# Patient Record
Sex: FEMALE | Race: WHITE | NOT HISPANIC OR LATINO | Employment: FULL TIME | URBAN - METROPOLITAN AREA
[De-identification: names, ages, dates, MRNs, and addresses within clinical notes are randomized per-mention and may not be internally consistent; named-entity substitution may affect disease eponyms.]

---

## 2023-07-18 ENCOUNTER — OFFICE VISIT (OUTPATIENT)
Dept: OBGYN CLINIC | Facility: CLINIC | Age: 63
End: 2023-07-18
Payer: COMMERCIAL

## 2023-07-18 VITALS
WEIGHT: 205 LBS | SYSTOLIC BLOOD PRESSURE: 124 MMHG | DIASTOLIC BLOOD PRESSURE: 80 MMHG | HEIGHT: 59 IN | BODY MASS INDEX: 41.33 KG/M2

## 2023-07-18 DIAGNOSIS — Z01.419 ENCOUNTER FOR GYNECOLOGICAL EXAMINATION WITHOUT ABNORMAL FINDING: Primary | ICD-10-CM

## 2023-07-18 DIAGNOSIS — Z12.31 ENCOUNTER FOR SCREENING MAMMOGRAM FOR BREAST CANCER: ICD-10-CM

## 2023-07-18 DIAGNOSIS — N76.3 SUBACUTE VULVITIS: ICD-10-CM

## 2023-07-18 DIAGNOSIS — Z12.4 SCREENING FOR CERVICAL CANCER: ICD-10-CM

## 2023-07-18 PROCEDURE — G0101 CA SCREEN;PELVIC/BREAST EXAM: HCPCS | Performed by: OBSTETRICS & GYNECOLOGY

## 2023-07-18 RX ORDER — LORATADINE 10 MG/1
10 TABLET ORAL DAILY
COMMUNITY
Start: 2023-06-06

## 2023-07-18 RX ORDER — DICLOFENAC POTASSIUM 50 MG/1
TABLET, FILM COATED ORAL
COMMUNITY
Start: 2023-06-21

## 2023-07-18 RX ORDER — CHLORTHALIDONE 25 MG/1
TABLET ORAL
COMMUNITY
Start: 2023-05-22

## 2023-07-18 NOTE — PROGRESS NOTES
[address] [place] [address], [address], [place], [address] [facility],[address]    ASSESSMENT/PLAN: Rui Jacobson is a 63 y.o.  who presents for annual gynecologic exam.    Encounter for routine gynecologic examination  - Routine well woman exam completed today. - Cervical Cancer Screening: Current ASCCP Guidelines reviewed. Last Pap: Not on file . Next Pap Due: today  - COVID vaccine  + JJ    - Contraceptive counseling discussed. Current contraception: none or condoms:   - Breast Cancer Screening: Last Mammogram Not on file,    - Colorectal cancer screening  Pt due back in  6 years    - The following were reviewed in today's visit: breast self exam, mammography screening ordered, family planning choices, menopause, osteoporosis, adequate intake of calcium and vitamin D, exercise and healthy diet    Additional problems addressed during this visit:  1. Encounter for gynecological examination without abnormal finding    2. Encounter for screening mammogram for breast cancer  -     Mammo screening bilateral w 3d & cad; Future    3. Screening for cervical cancer  -     IGP, Aptima HPV, Rfx 16/18,45    4. BMI 40.0-44.9, adult [facility])  Comments:  pt lost  90 +         CC:  Annual Gynecologic Examination    HPI: Rui Jacobson is a 63 y.o. No obstetric history on file. who presents for annual gynecologic examination. 62 yo  Here for wellness.   . No bleeding, bloating or  Satiety . B/l knee    Same partner   LMP around age  56 . The following portions of the patient's history were reviewed and updated as appropriate: She  has a past medical history of Hypertension, Legally blind, Macular degeneration, Obesity, and Retinal detachment. She  has a past surgical history that includes Replacement total knee bilateral (Bilateral). Her family history includes Cancer in her father; Cancer (age of onset: 67) in her mother. She  reports that she has never smoked.  She has never used smokeless tobacco. She reports current alcohol use. She reports that she does not use drugs. Current Outpatient Medications   Medication Sig Dispense Refill   • chlorthalidone 25 mg tablet TAKE 1 TABLET BY MOUTH EVERY DAY IN THE MORNING WITH FOOD FOR 90 DAYS     • diclofenac potassium (CATAFLAM) 50 mg tablet TAKE 1 TABLET BY MOUTH TWICE A DAY WITH FOOD OR MILK AS NEEDED     • loratadine (CLARITIN) 10 mg tablet Take 10 mg by mouth daily       No current facility-administered medications for this visit. She is allergic to penicillins and sulfa antibiotics. .    Review of Systems:  All systems normal except as noted in HPI          Objective:  /80   Ht 4' 10.5" (1.486 m)   Wt 93 kg (205 lb)   BMI 42.12 kg/m²    Physical Exam  Vitals and nursing note reviewed. Constitutional:       Appearance: Normal appearance. HENT:      Head: Normocephalic. Cardiovascular:      Rate and Rhythm: Normal rate and regular rhythm. Pulses: Normal pulses. Heart sounds: Normal heart sounds. Pulmonary:      Effort: Pulmonary effort is normal.      Breath sounds: Normal breath sounds. Chest:      Chest wall: No mass, lacerations, swelling, tenderness or edema. Breasts: Akira Score is 4. Breasts are symmetrical.      Right: Normal. No swelling, bleeding, inverted nipple, mass, nipple discharge, skin change or tenderness. Left: No swelling, bleeding, inverted nipple, mass, nipple discharge, skin change or tenderness. Abdominal:      General: Abdomen is flat. Bowel sounds are normal.      Palpations: Abdomen is soft. Genitourinary:     General: Normal vulva. Exam position: Lithotomy position. Pubic Area: No rash. Akira stage (genital): 4.      Labia:         Right: No rash, tenderness or lesion. Left: No rash, tenderness or lesion. Urethra: No urethral pain, urethral swelling or urethral lesion. Vagina: Normal.      Cervix: No cervical motion tenderness or discharge.       Uterus: Normal. Adnexa: Right adnexa normal and left adnexa normal.      Rectum: Normal.       Musculoskeletal:         General: Normal range of motion. Cervical back: Neck supple. Lymphadenopathy:      Upper Body:      Right upper body: No supraclavicular, axillary or pectoral adenopathy. Left upper body: No supraclavicular, axillary or pectoral adenopathy. Lower Body: No right inguinal adenopathy. No left inguinal adenopathy. Skin:     General: Skin is warm and dry. Neurological:      General: No focal deficit present. Mental Status: She is alert and oriented to person, place, and time. Psychiatric:         Mood and Affect: Mood normal.         Behavior: Behavior normal.         Thought Content:  Thought content normal.         Judgment: Judgment normal.

## 2023-07-22 LAB
CYTOLOGIST CVX/VAG CYTO: NORMAL
DX ICD CODE: NORMAL
HPV GENOTYPE REFLEX: NORMAL
HPV I/H RISK 4 DNA CVX QL PROBE+SIG AMP: NEGATIVE
OTHER STN SPEC: NORMAL
PATH REPORT.FINAL DX SPEC: NORMAL
SL AMB NOTE:: NORMAL
SL AMB SPECIMEN ADEQUACY: NORMAL
SL AMB TEST METHODOLOGY: NORMAL